# Patient Record
Sex: MALE | Race: WHITE | NOT HISPANIC OR LATINO | URBAN - METROPOLITAN AREA
[De-identification: names, ages, dates, MRNs, and addresses within clinical notes are randomized per-mention and may not be internally consistent; named-entity substitution may affect disease eponyms.]

---

## 2020-03-24 ENCOUNTER — OFFICE VISIT (OUTPATIENT)
Dept: URGENT CARE | Facility: CLINIC | Age: 30
End: 2020-03-24
Payer: COMMERCIAL

## 2020-03-24 VITALS
HEART RATE: 78 BPM | BODY MASS INDEX: 27.23 KG/M2 | WEIGHT: 169.4 LBS | SYSTOLIC BLOOD PRESSURE: 116 MMHG | HEIGHT: 66 IN | RESPIRATION RATE: 20 BRPM | DIASTOLIC BLOOD PRESSURE: 66 MMHG | OXYGEN SATURATION: 100 % | TEMPERATURE: 100.7 F

## 2020-03-24 DIAGNOSIS — R22.41 LOCALIZED SWELLING OF RIGHT FOOT: Primary | ICD-10-CM

## 2020-03-24 PROCEDURE — 99203 OFFICE O/P NEW LOW 30 MIN: CPT | Performed by: FAMILY MEDICINE

## 2020-03-24 NOTE — PROGRESS NOTES
[facility]        NAME: Adair Arenas is a 30 y o  male  : 1990    MRN: [id number]  DATE: 2020  TIME: 11:31 AM    Assessment and Plan   Localized swelling of right foot [R22 41]  1  Localized swelling of right foot       Advised on supportive therapy including elevating the right lower extremity with compression  Explained that this is likely a manifestation of a deeper medical condition such as nephritis  Would benefit from blood work and possible imaging  Strongly encouraged setting up appointment with PCP  Patient Instructions     Follow up with PCP in 3-5 days  Proceed to  ER if symptoms worsen  Chief Complaint     Chief Complaint   Patient presents with    Foot Pain     Pt here for right  foot pain x 2 days, no injury, pt states left foot is also starting to hurt  Pain /10  Pt used Ibuprofen  History of Present Illness       26-year-old male presents today due to new onset right pedal edema of unknown cause  Reports that he 1st developed swelling about 5 days ago which has progressively worsened  Is a , but denies any obvious trauma to right foot  Denies being drunk recently where he may have unknowingly injured the foot  Review of Systems   Review of Systems   Constitutional: Positive for fatigue  Negative for chills and fever  Respiratory: Negative for shortness of breath  Cardiovascular: Negative for chest pain  Gastrointestinal: Positive for nausea (mild intermittent)  Negative for abdominal pain  Musculoskeletal: Positive for joint swelling  Skin: Negative for rash  Neurological: Negative for dizziness and headaches  Current Medications     No current outpatient medications on file      Current Allergies     Allergies as of 2020    (No Known Allergies)            The following portions of the patient's history were reviewed and updated as appropriate: allergies, current medications, past family history, past medical history, past social history, past surgical history and problem list      Past Medical History:   Diagnosis Date    Patient denies medical problems        Past Surgical History:   Procedure Laterality Date    NO PAST SURGERIES         Family History   Problem Relation Age of Onset    No Known Problems Mother     No Known Problems Father          Medications have been verified  Objective   /66 (BP Location: Right arm, Patient Position: Sitting, Cuff Size: Standard)   Pulse 78   Temp (!) 100 7 °F (38 2 °C) (Tympanic)   Resp 20   Ht 5' 6" (1 676 m)   Wt 76 8 kg (169 lb 6 4 oz)   SpO2 100%   BMI 27 34 kg/m²        Physical Exam     Physical Exam   Constitutional: He is oriented to person, place, and time  He appears well-developed and well-nourished  He appears distressed (Right foot swelling)  HENT:   Head: Normocephalic and atraumatic  Eyes: Conjunctivae are normal  Right eye exhibits no discharge  Left eye exhibits no discharge  Pulmonary/Chest: Effort normal    Musculoskeletal: Normal range of motion  He exhibits edema and tenderness (Right foot with 2+ pitting edema)  He exhibits no deformity  Neurological: He is alert and oriented to person, place, and time  No sensory deficit  Skin: Skin is warm  He is not diaphoretic  No erythema  No overlying skin changes, wounds or erythema of the right foot  Psychiatric: He has a normal mood and affect  His behavior is normal  Judgment and thought content normal    Nursing note and vitals reviewed